# Patient Record
Sex: MALE | Race: WHITE | NOT HISPANIC OR LATINO | Employment: FULL TIME | URBAN - METROPOLITAN AREA
[De-identification: names, ages, dates, MRNs, and addresses within clinical notes are randomized per-mention and may not be internally consistent; named-entity substitution may affect disease eponyms.]

---

## 2020-09-25 ENCOUNTER — OFFICE VISIT (OUTPATIENT)
Dept: FAMILY MEDICINE CLINIC | Facility: CLINIC | Age: 40
End: 2020-09-25

## 2020-09-25 VITALS
DIASTOLIC BLOOD PRESSURE: 70 MMHG | WEIGHT: 199 LBS | TEMPERATURE: 97.8 F | BODY MASS INDEX: 25.54 KG/M2 | RESPIRATION RATE: 16 BRPM | HEIGHT: 74 IN | SYSTOLIC BLOOD PRESSURE: 110 MMHG | OXYGEN SATURATION: 98 % | HEART RATE: 76 BPM

## 2020-09-25 DIAGNOSIS — Z11.59 ENCOUNTER FOR HEPATITIS C SCREENING TEST FOR LOW RISK PATIENT: ICD-10-CM

## 2020-09-25 DIAGNOSIS — Z00.00 ANNUAL PHYSICAL EXAM: Primary | ICD-10-CM

## 2020-09-25 DIAGNOSIS — Z76.89 ENCOUNTER TO ESTABLISH CARE: ICD-10-CM

## 2020-09-25 DIAGNOSIS — Z80.0 FAMILY HISTORY OF COLON CANCER REQUIRING SCREENING COLONOSCOPY: ICD-10-CM

## 2020-09-25 PROCEDURE — 99396 PREV VISIT EST AGE 40-64: CPT | Performed by: NURSE PRACTITIONER

## 2020-09-25 PROCEDURE — 93000 ELECTROCARDIOGRAM COMPLETE: CPT | Performed by: NURSE PRACTITIONER

## 2020-09-25 NOTE — PROGRESS NOTES
"Chief Complaint   Patient presents with   • Establish Care   • Annual Exam       Subjective   Kingsley Bejarano is a 40 y.o. male and is here for a yearly physical exam. The patient reports no problems.    Do you take any herbs or supplements that were not prescribed by a doctor? no. If so, these will be added to active medication list.    Health Habits:  Dental Exam: Up to date  Eye Exam: Up to date  Diet: Nothing specific  Exercise: Run 25 miles a week    Current exercise activities include: Run  Colonoscopy: Never.  Mother had colon cancer.      The following portions of the patient's history were reviewed and updated as appropriate: allergies, current medications, past family history, past medical history, past social history, past surgical history and problem list.    Social and Family and Surgical History reviewed and updated today, see Rooming tab.    Health History, Preventive Measures and Vaccination flow sheets reviewed and updated today.    Patient's current medical chart in Epic; including previous office notes, imaging, labs, specialist's evaluation either in notes or in Media tab reviewed today.    Other pertinent medical information also reviewed thru Care Everywhere function is also reviewed today.    Review of Systems  Review of Systems   Constitutional: Negative.    HENT: Negative.    Respiratory: Negative.    Cardiovascular: Negative.    Gastrointestinal: Negative.    Endocrine: Negative.    Genitourinary: Negative.    Musculoskeletal: Negative.    Skin: Negative.    Neurological: Negative.    Hematological: Negative.    Psychiatric/Behavioral: Negative.        Vitals:    09/25/20 0825   BP: 110/70   BP Location: Left arm   Patient Position: Sitting   Cuff Size: Adult   Pulse: 76   Resp: 16   Temp: 97.8 °F (36.6 °C)   SpO2: 98%   Weight: 90.3 kg (199 lb)   Height: 188 cm (74\")       General Appearance:  Alert, cooperative, no distress, appears stated age   Head:  Normocephalic, without obvious " abnormality, atraumatic   Eyes:  PERRL, conjunctiva/corneas clear, EOM's intact.   Ears:  Normal TM's and external ear canals, both ears   Nose: Nares normal, septum midline, mucosa normal, no drainage or sinus tenderness   Throat: Lips, mucosa, and tongue normal; teeth and gums normal   Neck: Supple, symmetrical, trachea midline, no adenopathy;   thyroid: No enlargement/tenderness/nodules   Back:  Symmetric, no curvature, ROM normal, no CVA tenderness   Lungs:  Clear to auscultation bilaterally, respirations even and unlabored   Chest wall:  No tenderness or deformity   Heart:  Marshall rate and rhythm, S1 and S2 normal, no murmur, rub or gallop   Abdomen:  Soft, non-tender, bowel sounds active all four quadrants,   no masses, no organomegaly           Extremities: Extremities normal, atraumatic, no cyanosis or edema   Pulses: 2+ and symmetric all extremities   Skin: Skin color, texture, turgor normal, no rashes or lesions   Lymph nodes: Cervical, supraclavicular, and axillary nodes normal   Neurologic: CNII-XII intact. Normal strength, sensation and reflexes   throughout       ECG 12 Lead    Date/Time: 9/25/2020 9:04 AM  Performed by: Kristin Rogers APRN  Authorized by: Kristin Rogers APRN   Previous ECG: no previous ECG available  Rhythm: sinus bradycardia  Rate: bradycardic  BPM: 51  Conduction: conduction normal  ST Segments: ST segments normal  T Waves: T waves normal  QRS axis: normal  Other findings: T wave abnormality and early repolarization    Clinical impression: abnormal EKG  Comments: Sinus bradycardia.  Early repolarization present in leads V2, V3, V4. T-wave abnormality.  Check labs today. Asymptomatic.  Runner of 25 miles per week.                No results found for this or any previous visit.  Assessment/Plan   Healthy male exam.  Kingsley was seen today for establish care and annual exam.    Diagnoses and all orders for this visit:    Annual physical exam  -     ECG 12 Lead  -     CBC & Differential  -      Comprehensive Metabolic Panel  -     Lipid Panel  -     TSH    Encounter for hepatitis C screening test for low risk patient  -     Hepatitis C Antibody    Family history of colon cancer requiring screening colonoscopy  -     Ambulatory Referral For Screening Colonoscopy    Encounter to establish care      1. Well adult exam  2. Patient Counseling:  --Nutrition: Stressed importance of moderation in sodium/caffeine intake,      saturated fat and cholesterol.  Discussed caloric balance, sufficient intake of fresh fruits, vegetables, fiber, calcium, iron.  --Discussed the new recommendation against daily use of baby aspirin for primary prevention in low risk patients.  --Exercise: Stressed the importance of regular exercise.   --Substance Abuse: Discussed cessation/primary prevention of tobacco, alcohol,   or other drug use; driving or other dangerous activities under the influence.    --Dental health: Discussed importance of regular tooth brushing, flossing, and       dental visits.  --Suggested having eyes and vision checked if needed or past due.  --Immunizations reviewed.  --Discussed benefits of screening colonoscopy.  3. Discussed the patient's BMI with him.  The BMI is in the acceptable range  4. Follow up next physical in 1 year    Patient Instructions   Health Maintenance, Male  Adopting a healthy lifestyle and getting preventive care are important in promoting health and wellness. Ask your health care provider about:  · The right schedule for you to have regular tests and exams.  · Things you can do on your own to prevent diseases and keep yourself healthy.  What should I know about diet, weight, and exercise?  Eat a healthy diet    · Eat a diet that includes plenty of vegetables, fruits, low-fat dairy products, and lean protein.  · Do not eat a lot of foods that are high in solid fats, added sugars, or sodium.  Maintain a healthy weight  Body mass index (BMI) is a measurement that can be used to identify  possible weight problems. It estimates body fat based on height and weight. Your health care provider can help determine your BMI and help you achieve or maintain a healthy weight.  Get regular exercise  Get regular exercise. This is one of the most important things you can do for your health. Most adults should:  · Exercise for at least 150 minutes each week. The exercise should increase your heart rate and make you sweat (moderate-intensity exercise).  · Do strengthening exercises at least twice a week. This is in addition to the moderate-intensity exercise.  · Spend less time sitting. Even light physical activity can be beneficial.  Watch cholesterol and blood lipids  Have your blood tested for lipids and cholesterol at 20 years of age, then have this test every 5 years.  You may need to have your cholesterol levels checked more often if:  · Your lipid or cholesterol levels are high.  · You are older than 40 years of age.  · You are at high risk for heart disease.  What should I know about cancer screening?  Many types of cancers can be detected early and may often be prevented. Depending on your health history and family history, you may need to have cancer screening at various ages. This may include screening for:  · Colorectal cancer.  · Prostate cancer.  · Skin cancer.  · Lung cancer.  What should I know about heart disease, diabetes, and high blood pressure?  Blood pressure and heart disease  · High blood pressure causes heart disease and increases the risk of stroke. This is more likely to develop in people who have high blood pressure readings, are of  descent, or are overweight.  · Talk with your health care provider about your target blood pressure readings.  · Have your blood pressure checked:  ? Every 3-5 years if you are 18-39 years of age.  ? Every year if you are 40 years old or older.  · If you are between the ages of 65 and 75 and are a current or former smoker, ask your health care  provider if you should have a one-time screening for abdominal aortic aneurysm (AAA).  Diabetes  Have regular diabetes screenings. This checks your fasting blood sugar level. Have the screening done:  · Once every three years after age 45 if you are at a normal weight and have a low risk for diabetes.  · More often and at a younger age if you are overweight or have a high risk for diabetes.  What should I know about preventing infection?  Hepatitis B  If you have a higher risk for hepatitis B, you should be screened for this virus. Talk with your health care provider to find out if you are at risk for hepatitis B infection.  Hepatitis C  Blood testing is recommended for:  · Everyone born from 1945 through 1965.  · Anyone with known risk factors for hepatitis C.  Sexually transmitted infections (STIs)  · You should be screened each year for STIs, including gonorrhea and chlamydia, if:  ? You are sexually active and are younger than 24 years of age.  ? You are older than 24 years of age and your health care provider tells you that you are at risk for this type of infection.  ? Your sexual activity has changed since you were last screened, and you are at increased risk for chlamydia or gonorrhea. Ask your health care provider if you are at risk.  · Ask your health care provider about whether you are at high risk for HIV. Your health care provider may recommend a prescription medicine to help prevent HIV infection. If you choose to take medicine to prevent HIV, you should first get tested for HIV. You should then be tested every 3 months for as long as you are taking the medicine.  Follow these instructions at home:  Lifestyle  · Do not use any products that contain nicotine or tobacco, such as cigarettes, e-cigarettes, and chewing tobacco. If you need help quitting, ask your health care provider.  · Do not use street drugs.  · Do not share needles.  · Ask your health care provider for help if you need support or  information about quitting drugs.  Alcohol use  · Do not drink alcohol if your health care provider tells you not to drink.  · If you drink alcohol:  ? Limit how much you have to 0-2 drinks a day.  ? Be aware of how much alcohol is in your drink. In the [place] one drink equals one 12 oz bottle of beer (355 mL), one 5 oz glass of wine (148 mL), or one 1½ oz glass of hard liquor (44 mL).  General instructions  · Schedule regular health, dental, and eye exams.  · Stay current with your vaccines.  · Tell your health care provider if:  ? You often feel depressed.  ? You have ever been abused or do not feel safe at home.  Summary  · Adopting a healthy lifestyle and getting preventive care are important in promoting health and wellness.  · Follow your health care provider's instructions about healthy diet, exercising, and getting tested or screened for diseases.  · Follow your health care provider's instructions on monitoring your cholesterol and blood pressure.  This information is not intended to replace advice given to you by your health care provider. Make sure you discuss any questions you have with your health care provider.  Document Released: 06/15/2009 Document Revised: 12/11/2019 Document Reviewed: 12/11/2019  Elsevier Patient Education © 2020 [employer]        There are no discontinued medications.       Kristin Rogers, APRN  Family Practice  [facility] [place]

## 2020-09-26 LAB
ALBUMIN SERPL-MCNC: 5 G/DL (ref 3.5–5.2)
ALBUMIN/GLOB SERPL: 2.5 G/DL
ALP SERPL-CCNC: 108 U/L (ref 39–117)
ALT SERPL-CCNC: 17 U/L (ref 1–41)
AST SERPL-CCNC: 19 U/L (ref 1–40)
BASOPHILS # BLD AUTO: 0.04 10*3/MM3 (ref 0–0.2)
BASOPHILS NFR BLD AUTO: 0.7 % (ref 0–1.5)
BILIRUB SERPL-MCNC: 0.6 MG/DL (ref 0–1.2)
BUN SERPL-MCNC: 20 MG/DL (ref 6–20)
BUN/CREAT SERPL: 19.2 (ref 7–25)
CALCIUM SERPL-MCNC: 9.8 MG/DL (ref 8.6–10.5)
CHLORIDE SERPL-SCNC: 103 MMOL/L (ref 98–107)
CHOLEST SERPL-MCNC: 179 MG/DL (ref 0–200)
CO2 SERPL-SCNC: 26.8 MMOL/L (ref 22–29)
CREAT SERPL-MCNC: 1.04 MG/DL (ref 0.76–1.27)
EOSINOPHIL # BLD AUTO: 0.06 10*3/MM3 (ref 0–0.4)
EOSINOPHIL NFR BLD AUTO: 1.1 % (ref 0.3–6.2)
ERYTHROCYTE [DISTWIDTH] IN BLOOD BY AUTOMATED COUNT: 12.2 % (ref 12.3–15.4)
GLOBULIN SER CALC-MCNC: 2 GM/DL
GLUCOSE SERPL-MCNC: 90 MG/DL (ref 65–99)
HCT VFR BLD AUTO: 46.9 % (ref 37.5–51)
HCV AB S/CO SERPL IA: 0.1 S/CO RATIO (ref 0–0.9)
HDLC SERPL-MCNC: 43 MG/DL (ref 40–60)
HGB BLD-MCNC: 16.3 G/DL (ref 13–17.7)
IMM GRANULOCYTES # BLD AUTO: 0.03 10*3/MM3 (ref 0–0.05)
IMM GRANULOCYTES NFR BLD AUTO: 0.5 % (ref 0–0.5)
LDLC SERPL CALC-MCNC: 112 MG/DL (ref 0–100)
LYMPHOCYTES # BLD AUTO: 1.5 10*3/MM3 (ref 0.7–3.1)
LYMPHOCYTES NFR BLD AUTO: 26.9 % (ref 19.6–45.3)
MCH RBC QN AUTO: 31.2 PG (ref 26.6–33)
MCHC RBC AUTO-ENTMCNC: 34.8 G/DL (ref 31.5–35.7)
MCV RBC AUTO: 89.7 FL (ref 79–97)
MONOCYTES # BLD AUTO: 0.63 10*3/MM3 (ref 0.1–0.9)
MONOCYTES NFR BLD AUTO: 11.3 % (ref 5–12)
NEUTROPHILS # BLD AUTO: 3.32 10*3/MM3 (ref 1.7–7)
NEUTROPHILS NFR BLD AUTO: 59.5 % (ref 42.7–76)
NRBC BLD AUTO-RTO: 0 /100 WBC (ref 0–0.2)
PLATELET # BLD AUTO: 229 10*3/MM3 (ref 140–450)
POTASSIUM SERPL-SCNC: 5.1 MMOL/L (ref 3.5–5.2)
PROT SERPL-MCNC: 7 G/DL (ref 6–8.5)
RBC # BLD AUTO: 5.23 10*6/MM3 (ref 4.14–5.8)
SODIUM SERPL-SCNC: 140 MMOL/L (ref 136–145)
TRIGL SERPL-MCNC: 119 MG/DL (ref 0–150)
TSH SERPL DL<=0.005 MIU/L-ACNC: 2.19 UIU/ML (ref 0.27–4.2)
VLDLC SERPL CALC-MCNC: 23.8 MG/DL
WBC # BLD AUTO: 5.58 10*3/MM3 (ref 3.4–10.8)

## 2020-09-28 NOTE — PROGRESS NOTES
Notify Kingsley Bejarano all his recent labs look good.  Liver, kidney, and thyroid function tests are normal. LDL cholesterol level slightly elevated at 112., like less than 100.  All other cholesterol levels in normal range.  He already watches diet and exercises regularly.

## 2020-10-06 ENCOUNTER — PREP FOR SURGERY (OUTPATIENT)
Dept: GASTROENTEROLOGY | Facility: CLINIC | Age: 40
End: 2020-10-06

## 2020-10-06 DIAGNOSIS — Z83.71 FAMILY HX COLONIC POLYPS: ICD-10-CM

## 2020-10-06 DIAGNOSIS — Z80.0 FAMILY HX OF COLON CANCER: Primary | ICD-10-CM

## 2020-10-06 DIAGNOSIS — Z12.11 ENCOUNTER FOR SCREENING FOR MALIGNANT NEOPLASM OF COLON: ICD-10-CM

## 2021-01-05 ENCOUNTER — LAB REQUISITION (OUTPATIENT)
Dept: LAB | Facility: HOSPITAL | Age: 41
End: 2021-01-05

## 2021-01-05 DIAGNOSIS — Z00.00 ENCOUNTER FOR GENERAL ADULT MEDICAL EXAMINATION WITHOUT ABNORMAL FINDINGS: ICD-10-CM

## 2021-01-06 PROCEDURE — U0004 COV-19 TEST NON-CDC HGH THRU: HCPCS | Performed by: INTERNAL MEDICINE

## 2021-01-07 LAB — SARS-COV-2 RNA RESP QL NAA+PROBE: NOT DETECTED

## 2021-01-08 ENCOUNTER — OUTSIDE FACILITY SERVICE (OUTPATIENT)
Dept: GASTROENTEROLOGY | Facility: CLINIC | Age: 41
End: 2021-01-08

## 2021-01-08 ENCOUNTER — LAB REQUISITION (OUTPATIENT)
Dept: LAB | Facility: HOSPITAL | Age: 41
End: 2021-01-08

## 2021-01-08 DIAGNOSIS — Z12.11 ENCOUNTER FOR SCREENING FOR MALIGNANT NEOPLASM OF COLON: ICD-10-CM

## 2021-01-08 PROCEDURE — 45380 COLONOSCOPY AND BIOPSY: CPT | Performed by: INTERNAL MEDICINE

## 2021-01-08 PROCEDURE — 88305 TISSUE EXAM BY PATHOLOGIST: CPT | Performed by: INTERNAL MEDICINE

## 2021-01-19 LAB
LAB AP CASE REPORT: NORMAL
LAB AP CLINICAL INFORMATION: NORMAL
PATH REPORT.ADDENDUM SPEC: NORMAL
PATH REPORT.FINAL DX SPEC: NORMAL
PATH REPORT.GROSS SPEC: NORMAL

## 2021-06-28 ENCOUNTER — OFFICE VISIT (OUTPATIENT)
Dept: FAMILY MEDICINE CLINIC | Facility: CLINIC | Age: 41
End: 2021-06-28

## 2021-06-28 VITALS
WEIGHT: 205 LBS | BODY MASS INDEX: 26.31 KG/M2 | HEIGHT: 74 IN | OXYGEN SATURATION: 99 % | RESPIRATION RATE: 16 BRPM | DIASTOLIC BLOOD PRESSURE: 68 MMHG | SYSTOLIC BLOOD PRESSURE: 120 MMHG | HEART RATE: 74 BPM | TEMPERATURE: 98 F

## 2021-06-28 DIAGNOSIS — F32.A DEPRESSION, UNSPECIFIED DEPRESSION TYPE: Primary | ICD-10-CM

## 2021-06-28 PROCEDURE — 99213 OFFICE O/P EST LOW 20 MIN: CPT | Performed by: NURSE PRACTITIONER

## 2021-06-28 NOTE — PROGRESS NOTES
Patient ID: Kingsley Bejarano is a 40 y.o. male     Patient Care Team:  Head, EDWIN Santos as PCP - General (Nurse Practitioner)    Subjective     Chief Complaint   Patient presents with   • Depression       Kingsley Bejarano presents to [facility] today for depression.     Depression  Visit Type: initial  Onset of symptoms: 1 to 6 months ago  Progression since onset: waxing and waning  Patient presents with the following symptoms: decreased concentration, depressed mood and excessive worry.  Patient is not experiencing: fatigue, insomnia, nausea, palpitations, panic, shortness of breath and thoughts of death.  Severity: interfering with daily activities   Aggravated by: work stress  Sleep per night: 6 hours  Sleep quality: fair  Nighttime awakenings: none  Risk factors: family history      Main problem is increased stress with work.  He works approximately 60 hours/week.  Mainly goes to work and then comes home.  Unable to participate in activities he used to enjoy.  The current situation is starting to affect his marriage which is worsening his depression.  He recently went on vacation to [facility] over a week ago.  He does feel his mood has improved since then.  He is wishing to undergo counseling rather than starting medication at this time.  He also usually is able to run approximately 25 miles a week however is not able to do so due to work schedule at this time.    He denies any complaints of fever, chills, cough, chest pain, shortness of air, abdominal pain, nausea, or any other concerns.     The following portions of the patient's history were reviewed and updated as appropriate: allergies, current medications, past family history, past medical history, past social history, past surgical history and problem list.       Review of Systems   Cardiovascular: Negative for palpitations.   Respiratory: Negative for shortness of breath.    Psychiatric/Behavioral: Positive for decreased  concentration. The patient does not have insomnia.        Vitals:    06/28/21 1545   BP: 120/68   Pulse: 74   Resp: 16   Temp: 98 °F (36.7 °C)   SpO2: 99%       Documented weights    06/28/21 1545   Weight: 93 kg (205 lb)     Body mass index is 26.32 kg/m².    Results for orders placed or performed in visit on 01/08/21   Tissue Pathology Exam    Specimen: Large Intestine, Rectum; Tissue   Result Value Ref Range    Addendum       Addendum inadvertently created.         Case Report       Surgical Pathology Report                         Case: [id number]                                  Authorizing Provider:  Flo Person MD      Collected:           01/08/2021 08:45 AM          Ordering Location:     [facility]  Received:            01/08/2021 11:16 AM                                 LABORATORY                                                                   Pathologist:           Luly Tellez MD                                                    Specimen:    Large Intestine, Rectum, Rectal polyp x 2                                                  Clinical Information       Screening, family hx colon cancer      Final Diagnosis       1. Rectum, Biopsies:   A. Tubular adenoma and hyperplastic polyp.   B. Negative for high-grade dysplasia.    Swm/kds      Gross Description       1.  The specimen is received in formalin labeled with the patient's name and further designated 'rectal polyps biopsy' are multiple small fragments of gray-tan tissue. The specimen is submitted for embedding as received.         Patient screened positive for depression based on a PHQ-9 score of 10 on 6/28/2021. Follow-up recommendations include: Referral to Mental Health specialist.          Objective     Physical Exam  Vitals reviewed.   Constitutional:       General: He is not in acute distress.  Cardiovascular:      Rate and Rhythm: Normal rate.   Pulmonary:      Effort: Pulmonary effort is normal.    Neurological:      Mental Status: He is alert.   Psychiatric:         Attention and Perception: Attention normal.         Mood and Affect: Mood normal.         Behavior: Behavior normal.         Thought Content: Thought content normal.            Assessment/Plan     Assessment/Plan     Diagnoses and all orders for this visit:    1. Depression, unspecified depression type (Primary)  -     Ambulatory Referral to Psychology          Summary:  Kingsley Bejarano presented to office today due to worsening depression.  Appears to be primarily result of increased stress at work and excessive work hours.  He would like to undergo counseling rather than start medication at this time.  I placed referral for psychology and also provided a list of mental health specialist the patient if he wishes to call and schedule himself.  He is to let us know if he changes his mind and wishes to start medication to see if helps with controlling his symptoms.  Also advised would be helpful if he is able to get back into his regular exercise routine which would help improve his mood.    In the meantime, instructed to contact us sooner for any problems or concerns.    Follow Up:  Return if symptoms worsen or fail to improve.    Patient was given instructions and counseling regarding condition or for health maintenance advice.  Please see specific information pulled into the AVS if appropriate.      Patient was wearing facemask when I entered the room and throughout our encounter. Protective equipment was worn throughout this patient encounter including a face mask, eye protection,  and gloves. Hand hygiene was performed before donning protective equipment and after removal when leaving the room.     EDWIN Snider  Family Medicine  [facility] [place]  06/28/21  18:31 EDT

## 2021-06-28 NOTE — PATIENT INSTRUCTIONS
"http://APA.org/depression-guideline\"> https://clinicalkey.com\"> http://point-of-care.[employer]AK [employer]skills/\"> http://point-of-care.[employer].com\">   Managing Depression, Adult  Depression is a mental health condition that affects your thoughts, feelings, and actions. Being diagnosed with depression can bring you relief if you did not know why you have felt or behaved a certain way. It could also leave you feeling overwhelmed with uncertainty about your future. Preparing yourself to manage your symptoms can help you feel more positive about your future.  How to manage lifestyle changes  Managing stress    Stress is your body's reaction to life changes and events, both good and bad. Stress can add to your feelings of depression. Learning to manage your stress can help lessen your feelings of depression.  Try some of the following approaches to reducing your stress (stress reduction techniques):  · Listen to music that you enjoy and that inspires you.  · Try using a meditation brigette or take a meditation class.  · Develop a practice that helps you connect with your spiritual self. Walk in nature, pray, or go to a place of Evangelical.  · Do some deep breathing. To do this, inhale slowly through your nose. Pause at the top of your inhale for a few seconds and then exhale slowly, letting your muscles relax.  · Practice yoga to help relax and work your muscles.  Choose a stress reduction technique that suits your lifestyle and personality. These techniques take time and practice to develop. Set aside 5-15 minutes a day to do them. Therapists can offer training in these techniques. Other things you can do to manage stress include:  · Keeping a stress diary.  · Knowing your limits and saying no when you think something is too much.  · Paying attention to how you react to certain situations. You may not be able to control everything, but you can change your reaction.  · Adding humor to your life by " watching funny films or TV shows.  · Making time for activities that you enjoy and that relax you.    Medicines  Medicines, such as antidepressants, are often a part of treatment for depression.  · Talk with your pharmacist or health care provider about all the medicines, supplements, and herbal products that you take, their possible side effects, and what medicines and other products are safe to take together.  · Make sure to report any side effects you may have to your health care provider.  Relationships  Your health care provider may suggest family therapy, couples therapy, or individual therapy as part of your treatment.  How to recognize changes  Everyone responds differently to treatment for depression. As you recover from depression, you may start to:  · Have more interest in doing activities.  · Feel less hopeless.  · Have more energy.  · Overeat less often, or have a better appetite.  · Have better mental focus.  It is important to recognize if your depression is not getting better or is getting worse. The symptoms you had in the beginning may return, such as:  · Tiredness (fatigue) or low energy.  · Eating too much or too little.  · Sleeping too much or too little.  · Feeling restless, agitated, or hopeless.  · Trouble focusing or making decisions.  · Unexplained physical complaints.  · Feeling irritable, angry, or aggressive.  If you or your family members notice these symptoms coming back, let your health care provider know right away.  Follow these instructions at home:  Activity    · Try to get some form of exercise each day, such as walking, biking, swimming, or lifting weights.  · Practice stress reduction techniques.  · Engage your mind by taking a class or doing some volunteer work.  Lifestyle  · Get the right amount and quality of sleep.  · Cut down on using caffeine, tobacco, alcohol, and other potentially harmful substances.  · Eat a healthy diet that includes plenty of vegetables, fruits,  whole grains, low-fat dairy products, and lean protein. Do not eat a lot of foods that are high in solid fats, added sugars, or salt (sodium).  General instructions  · Take over-the-counter and prescription medicines only as told by your health care provider.  · Keep all follow-up visits as told by your health care provider. This is important.  Where to find support  Talking to others    Friends and family members can be sources of support and guidance. Talk to trusted friends or family members about your condition. Explain your symptoms to them, and let them know that you are working with a health care provider to treat your depression. Tell friends and family members how they also can be helpful.  Finances  · Find appropriate mental health providers that fit with your financial situation.  · Talk with your health care provider about options to get reduced prices on your medicines.  Where to find more information  You can find support in your area from:  · Anxiety and Depression Association of [place] (ADAA): www.adaa.org  · Mental Health [place]: www.mentalhealthamerica.net  · National [place] on Mental Illness: www.buck.org  Contact a health care provider if:  · You stop taking your antidepressant medicines, and you have any of these symptoms:  ? Nausea.  ? Headache.  ? Light-headedness.  ? Chills and body aches.  ? Not being able to sleep (insomnia).  · You or your friends and family think your depression is getting worse.  Get help right away if:  · You have thoughts of hurting yourself or others.  If you ever feel like you may hurt yourself or others, or have thoughts about taking your own life, get help right away. Go to your nearest emergency department or:  · Call your local emergency services (214 in the [place]  · Call a suicide crisis helpline, such as the National Suicide Prevention Lifeline at 1-471.575.2475. This is open 24 hours a day in the [place]  · Text the Crisis Text Line at 256592 (in the  [place]  Summary  · If you are diagnosed with depression, preparing yourself to manage your symptoms is a good way to feel positive about your future.  · Work with your health care provider on a management plan that includes stress reduction techniques, medicines (if applicable), therapy, and healthy lifestyle habits.  · Keep talking with your health care provider about how your treatment is working.  · If you have thoughts about taking your own life, call a suicide crisis helpline or text a crisis text line.  This information is not intended to replace advice given to you by your health care provider. Make sure you discuss any questions you have with your health care provider.  Document Revised: 10/28/2020 Document Reviewed: 10/28/2020  [employer] Patient Education © 2021 [employer]      Persistent Depressive Disorder, Adult  Persistent depressive disorder (PDD) is a mental health condition that causes symptoms of low-level depression for 2 years or longer. This disorder may also be called long-term (chronic) depression or dysthymia. PDD may include episodes of major depressive disorder (MDD), which is more severe depression that lasts for about 2 weeks.  PDD can affect the way you think, feel, and behave. This condition may also affect your relationships. You may be more likely to get sick if you have PDD.  What are the causes?  The exact cause of this condition is not known. PDD is most likely caused by a combination of things, which may include:  · Your personality traits.  · Your learned or conditioned behaviors, thoughts or feelings that reinforce negativity.  · Substance abuse or misuse.  · How you are able to manage chronic medical or mental health illness you may have.  · Going through a traumatic experience or major life changes.  What increases the risk?  The following factors may also make someone more likely to develop PDD:  · A family history of depression.  · Being a woman.  · Abnormally low levels of  certain brain chemicals.  · Traumatic or painful events in childhood, especially abuse or the loss of a parent.  · Chronic stress caused by upsetting life experiences, troubled family relationships, or losses.  · Chronic physical illness or other mental health disorders.  · Cultural stress, such as stress from poor living conditions or discrimination.  What are the signs or symptoms?  Symptoms of this condition may occur for most of the day, and may include:  · Physical symptoms. These may include:  ? Tiredness or low energy.  ? Eating or sleeping too much or too little.  ? Being restless or agitated.  ? Unexplained physical complaints.  · Mental and emotional symptoms. These may be:  ? Feeling hopeless, worthless, or guilty.  ? Anxiety.  ? Trouble focusing or making decisions.  ? Low self-esteem.  ? Negative outlook.  ? Feeling irritable.  ? Being unable to have fun or feel pleasure.  · Behavioral symptoms. These may include:  ? Withdrawing from others.  ? Aggressive behavior or anger.  How is this diagnosed?  This condition may be diagnosed based on:  · Your symptoms.  · Your medical history, including your mental health history. This may involve tests to evaluate your level of depression. You may be asked questions about your lifestyle, including any drug and alcohol use, and how long you have had symptoms of PDD.  · A physical exam.  · Blood tests to rule out other conditions.  PDD may be diagnosed if you have had the following symptoms:  · A depressed mood or loss of interest in things for 2 years or longer.  · Other symptoms of depression.  How is this treated?  This condition is usually treated by mental health professionals, such as psychologists, psychiatrists, and clinical social workers. You may need more than one type of treatment. Treatment may include:  · Psychotherapy, also called talk therapy or counseling. Types of psychotherapy include:  ? Cognitive behavioral therapy (CBT). This teaches you to  recognize unhealthy feelings, thoughts, and behaviors, and to replace them with positive thoughts and actions.  ? Interpersonal therapy (IPT). This helps you to improve the way you relate to and communicate with others.  ? Family therapy. This treatment involves members of your family.  · Medicines to treat anxiety and depression, or to help balance chemicals in your brain that affect emotions and behaviors.  · Lifestyle changes, such as:  ? Limiting alcohol and drug use.  ? Getting regular exercise.  ? Developing a consistent sleep routine.  ? Making healthy eating choices.  ? Spending more time outdoors.  A combination of psychotherapy and medicines is thought to be the most effective form of treatment.  Follow these instructions at home:  Activity    · Exercise regularly and spend time outdoors as told.  · Find activities that you enjoy doing, and make time to do them.  · Find healthy ways to manage stress, such as:  ? Meditation or deep breathing.  ? Spending time in nature.  ? Journaling.  · Return to your normal activities as told by your health care provider.  Alcohol and drug use  · If you drink alcohol:  ? Limit how much you use to:  § 0-1 drink a day for nonpregnant women.  § 0-2 drinks a day for men.  ? Be aware of how much alcohol is in your drink. In the [place] one drink equals one 12 oz bottle of beer (355 mL), one 5 oz glass of wine (148 mL), or one 1½ oz glass of hard liquor (44 mL).  ? Discuss your alcohol use with your health care provider. Alcohol can affect any antidepressant medicines you are taking.  · Discuss any drug use with your health care provider.  General instructions    · Take over-the-counter and prescription medicines and herbal preparations only as told by your health care provider.  · Eat a healthy diet and get plenty of sleep.  · Consider joining a support group. Your health care provider may be able to recommend one.  · Keep all follow-up visits as told by your health care  provider. This is important.  Where to find more information  · National [place] on Mental Illness: www.buck.org  · [place] National [place] of Mental Health: www.nimh.nih.gov  · American Psychiatric Association: www.psychiatry.org/patients-families  Contact a health care provider if:  · Your symptoms get worse.  · You develop new symptoms.  · You have trouble sleeping or doing your daily activities.  Get help right away if:  · You harm yourself.  · You have serious thoughts about hurting yourself or others.  · You see, hear, taste, smell, or feel things that are not real (hallucinate).  If you ever feel like you may hurt yourself or others, or have thoughts about taking your own life, get help right away. Go to your nearest emergency department or:  · Call your local emergency services (830 in the [place]  · Call a suicide crisis helpline, such as the National Suicide Prevention Lifeline at 1-846.888.7646. This is open 24 hours a day in the [place]  · Text the Crisis Text Line at 234501 (in the [place]  Summary  · Persistent depressive disorder (PDD) is a mental health condition that causes symptoms of low-level depression for 2 years or longer.  · This condition is usually treated by mental health professionals. You may need more than one type of treatment. Treatment may involve psychotherapy, medicines, and lifestyle changes.  · Get help right away if you have serious thoughts about hurting yourself or others.  This information is not intended to replace advice given to you by your health care provider. Make sure you discuss any questions you have with your health care provider.  Document Revised: 11/28/2020 Document Reviewed: 11/28/2020  Elsevier Patient Education © 2021 [employer].

## 2022-08-18 DIAGNOSIS — Z00.00 ANNUAL PHYSICAL EXAM: ICD-10-CM

## 2022-08-18 DIAGNOSIS — Z00.00 ANNUAL PHYSICAL EXAM: Primary | ICD-10-CM

## 2022-08-20 LAB
ALBUMIN SERPL-MCNC: 5.1 G/DL (ref 4–5)
ALBUMIN/GLOB SERPL: 2.7 {RATIO} (ref 1.2–2.2)
ALP SERPL-CCNC: 91 IU/L (ref 44–121)
ALT SERPL-CCNC: 20 IU/L (ref 0–44)
AST SERPL-CCNC: 28 IU/L (ref 0–40)
BILIRUB SERPL-MCNC: 0.8 MG/DL (ref 0–1.2)
BUN SERPL-MCNC: 25 MG/DL (ref 6–24)
BUN/CREAT SERPL: 26 (ref 9–20)
CALCIUM SERPL-MCNC: 10.1 MG/DL (ref 8.7–10.2)
CHLORIDE SERPL-SCNC: 102 MMOL/L (ref 96–106)
CHOLEST SERPL-MCNC: 184 MG/DL (ref 100–199)
CO2 SERPL-SCNC: 22 MMOL/L (ref 20–29)
CREAT SERPL-MCNC: 0.98 MG/DL (ref 0.76–1.27)
EGFRCR-CYS SERPLBLD CKD-EPI 2021: 98 ML/MIN/1.73
ERYTHROCYTE [DISTWIDTH] IN BLOOD BY AUTOMATED COUNT: 12.9 % (ref 11.6–15.4)
GLOBULIN SER CALC-MCNC: 1.9 G/DL (ref 1.5–4.5)
GLUCOSE SERPL-MCNC: 78 MG/DL (ref 65–99)
HCT VFR BLD AUTO: 46.9 % (ref 37.5–51)
HDLC SERPL-MCNC: 52 MG/DL
HGB BLD-MCNC: 15.5 G/DL (ref 13–17.7)
LDLC SERPL CALC-MCNC: 119 MG/DL (ref 0–99)
MCH RBC QN AUTO: 29.8 PG (ref 26.6–33)
MCHC RBC AUTO-ENTMCNC: 33 G/DL (ref 31.5–35.7)
MCV RBC AUTO: 90 FL (ref 79–97)
PLATELET # BLD AUTO: 234 X10E3/UL (ref 150–450)
POTASSIUM SERPL-SCNC: 5.4 MMOL/L (ref 3.5–5.2)
PROT SERPL-MCNC: 7 G/DL (ref 6–8.5)
RBC # BLD AUTO: 5.2 X10E6/UL (ref 4.14–5.8)
SODIUM SERPL-SCNC: 141 MMOL/L (ref 134–144)
TRIGL SERPL-MCNC: 69 MG/DL (ref 0–149)
TSH SERPL DL<=0.005 MIU/L-ACNC: 1.51 UIU/ML (ref 0.45–4.5)
VLDLC SERPL CALC-MCNC: 13 MG/DL (ref 5–40)
WBC # BLD AUTO: 5.2 X10E3/UL (ref 3.4–10.8)

## 2022-08-26 ENCOUNTER — OFFICE VISIT (OUTPATIENT)
Dept: FAMILY MEDICINE CLINIC | Facility: CLINIC | Age: 42
End: 2022-08-26

## 2022-08-26 VITALS
OXYGEN SATURATION: 99 % | WEIGHT: 192 LBS | HEIGHT: 74 IN | SYSTOLIC BLOOD PRESSURE: 110 MMHG | BODY MASS INDEX: 24.64 KG/M2 | DIASTOLIC BLOOD PRESSURE: 70 MMHG | RESPIRATION RATE: 16 BRPM | HEART RATE: 62 BPM | TEMPERATURE: 98 F

## 2022-08-26 DIAGNOSIS — E78.00 ELEVATED LDL CHOLESTEROL LEVEL: ICD-10-CM

## 2022-08-26 DIAGNOSIS — G89.29 CHRONIC PAIN OF RIGHT WRIST: ICD-10-CM

## 2022-08-26 DIAGNOSIS — S76.212A STRAIN OF LEFT GROIN: ICD-10-CM

## 2022-08-26 DIAGNOSIS — Z00.00 ANNUAL PHYSICAL EXAM: Primary | ICD-10-CM

## 2022-08-26 DIAGNOSIS — Z23 IMMUNIZATION DUE: ICD-10-CM

## 2022-08-26 DIAGNOSIS — M25.531 CHRONIC PAIN OF RIGHT WRIST: ICD-10-CM

## 2022-08-26 PROCEDURE — 90715 TDAP VACCINE 7 YRS/> IM: CPT | Performed by: NURSE PRACTITIONER

## 2022-08-26 PROCEDURE — 90471 IMMUNIZATION ADMIN: CPT | Performed by: NURSE PRACTITIONER

## 2022-08-26 PROCEDURE — 99396 PREV VISIT EST AGE 40-64: CPT | Performed by: NURSE PRACTITIONER

## 2022-08-26 NOTE — PROGRESS NOTES
Chief Complaint   Patient presents with   • Annual Exam       Patient Care Team:  Head, EDWIN Santos as PCP - General (Nurse Practitioner)    Subjective   Kingsley Bejarano is a 41 y.o. male and is here for a yearly physical exam. The patient reports problems - right wrist pain and left groin pain.      Right wrist pain - onset 5+ years ago.  Intermittent.  Recently changed keyboard at work which has helped.  Sometimes has trouble using right hand and will have numbness in 5th digit.  Recalls curling too heavy of weights many years ago.  No treatment.      6 months left groin pain.  No home remedies.  No known injury.  Intermittent.  Occurs when he is coughing or sit-up.  Was doing weight lifting however stopped about one month due to rather run for exercise.  Has not noticed improvement in symptoms.        Do you take any herbs or supplements that were not prescribed by a doctor? no. If so, these will be added to active medication list.    Health Habits:  Dental Exam: Up to date  Eye Exam: Up to date  Diet: Nothing specific - Keto diet during work week.    Exercise: Run 20 miles a week    Current exercise activities include: Run  Colonoscopy: 1/8/2021 - repeat in 4 years.  Mother had hx of colon cancer.      The following portions of the patient's history were reviewed and updated as appropriate: allergies, current medications, past family history, past medical history, past social history, past surgical history and problem list.    Social and Family and Surgical History reviewed and updated today, see Rooming tab.    Health History, Preventive Measures and Vaccination flow sheets reviewed and updated today.    Patient's current medical chart in Epic; including previous office notes, imaging, labs, specialist's evaluation either in notes or in Media tab reviewed today.    Other pertinent medical information also reviewed thru Care Everywhere function is also reviewed today.    Review of Systems  Review of  "Systems  Vitals:    08/26/22 0825   BP: 110/70   BP Location: Left arm   Patient Position: Sitting   Cuff Size: Adult   Pulse: 62   Resp: 16   Temp: 98 °F (36.7 °C)   SpO2: 99%   Weight: 87.1 kg (192 lb)   Height: 188 cm (74\")     Wt Readings from Last 3 Encounters:   08/26/22 87.1 kg (192 lb)   06/28/21 93 kg (205 lb)   09/25/20 90.3 kg (199 lb)       General Appearance:  Alert, cooperative, no distress, appears stated age   Head:  Normocephalic, without obvious abnormality, atraumatic   Eyes:  PERRL, conjunctiva/corneas clear, EOM's intact.   Ears:  Normal TM's and external ear canals, both ears   Nose: Nares normal, septum midline, mucosa normal, no drainage or sinus tenderness   Throat: Lips, mucosa, and tongue normal; teeth and gums normal   Neck: Supple, symmetrical, trachea midline, no adenopathy;   thyroid: No enlargement/tenderness/nodules       Lungs:  Clear to auscultation bilaterally, respirations even and unlabored   Chest wall:  No tenderness or deformity   Heart:  Regular rate and rhythm, S1 and S2 normal, no murmur, rub or gallop   Abdomen:  Soft, non-tender, bowel sounds active all four quadrants,   no masses, no organomegaly    :  Chaperoned per Mikayla Hough CMA. No inguinal hernia noted bilaterally.  Tenderness to left groin muscle.         Extremities: Full ROM of right wrist.  Tenderness to ulnar aspect of redness.  No bruising, redness, or swelling.     Pulses: 2+ and symmetric all extremities   Skin: Skin color, texture, turgor normal, no rashes or lesions   Lymph nodes: Cervical, supraclavicular, and axillary nodes normal   Neurologic: CNII-XII intact. Normal strength, sensation and reflexes   throughout       Results for orders placed or performed in visit on 08/18/22   TSH    Specimen: Blood   Result Value Ref Range    TSH 1.510 0.450 - 4.500 uIU/mL   Lipid Panel    Specimen: Blood   Result Value Ref Range    Total Cholesterol 184 100 - 199 mg/dL    Triglycerides 69 0 - 149 mg/dL    HDL " Cholesterol 52 >39 mg/dL    VLDL Cholesterol Wilner 13 5 - 40 mg/dL    LDL Chol Calc ([facility]) 119 (H) 0 - 99 mg/dL   Comprehensive Metabolic Panel    Specimen: Blood   Result Value Ref Range    Glucose 78 65 - 99 mg/dL    BUN 25 (H) 6 - 24 mg/dL    Creatinine 0.98 0.76 - 1.27 mg/dL    EGFR Result 98 >59 mL/min/1.73    BUN/Creatinine Ratio 26 (H) 9 - 20    Sodium 141 134 - 144 mmol/L    Potassium 5.4 (H) 3.5 - 5.2 mmol/L    Chloride 102 96 - 106 mmol/L    Total CO2 22 20 - 29 mmol/L    Calcium 10.1 8.7 - 10.2 mg/dL    Total Protein 7.0 6.0 - 8.5 g/dL    Albumin 5.1 (H) 4.0 - 5.0 g/dL    Globulin 1.9 1.5 - 4.5 g/dL    A/G Ratio 2.7 (H) 1.2 - 2.2    Total Bilirubin 0.8 0.0 - 1.2 mg/dL    Alkaline Phosphatase 91 44 - 121 IU/L    AST (SGOT) 28 0 - 40 IU/L    ALT (SGPT) 20 0 - 44 IU/L   CBC (No Diff)    Specimen: Blood   Result Value Ref Range    WBC 5.2 3.4 - 10.8 x10E3/uL    RBC 5.20 4.14 - 5.80 x10E6/uL    Hemoglobin 15.5 13.0 - 17.7 g/dL    Hematocrit 46.9 37.5 - 51.0 %    MCV 90 79 - 97 fL    MCH 29.8 26.6 - 33.0 pg    MCHC 33.0 31.5 - 35.7 g/dL    RDW 12.9 11.6 - 15.4 %    Platelets 234 150 - 450 x10E3/uL     Assessment & Plan   Healthy male exam.  Diagnoses and all orders for this visit:    1. Annual physical exam (Primary)  -     CBC (No Diff); Future  -     Comprehensive Metabolic Panel; Future  -     Lipid Panel With / Chol / HDL Ratio; Future  -     TSH Rfx On Abnormal To Free T4; Future    2. Chronic pain of right wrist  -     Ambulatory Referral to Hand Surgery    3. Immunization due  -     Tdap Vaccine Greater Than or Equal To 8yo IM    4. Strain of left groin   Rest.  Avoid heavy lifting.  Try heating pad to area.  Stretching exercises.  Tylenol or ibuprofen.  If symptoms do not improve, will need to proceed with CT abdomen and pelvis for further evaluation.     5. Elevated LDL cholesterol level  -     Lipid Panel With / Chol / HDL Ratio; Future      1. Kingsley Bejarano has been doing well except for chronic right  wrist pain and new onset left groin pain.  Referral to hand and wrist surgeon due to ongoing problems concerning his right wrist.  Exam appears stable in office today.  No signs of inguinal hernia on exam today.  Appears to be related to left groin strain.  Instructions provided.  He is to let us know if symptoms do not improve with these measures.  Reviewed recent labs along with patient which all appear stable.  LDL cholesterol slightly elevated however all other values normal.  He watches his diet and exercises regularly.  He takes no prescription medication on a regular basis.  We will have him return in 1 year for next annual physical with fasting labs.  In the meantime, instructed contact us sooner for any problems or concerns.  2. Patient Counseling:  --Nutrition: Stressed importance of moderation in sodium/caffeine intake, saturated fat and cholesterol.  Discussed caloric balance, sufficient intake of fresh fruits, vegetables, fiber,    calcium, iron.  --Exercise: Stressed the importance of regular exercise.   --Substance Abuse: Discussed cessation/primary prevention of tobacco, alcohol, or other drug use; driving or other dangerous activities under the influence.    --Dental health: Discussed importance of regular tooth brushing, flossing, and dental visits.  --Suggested having eyes and vision checked if needed or past due.  --Immunizations reviewed.  --Discussed benefits of screening colonoscopy.  3. Discussed the patient's BMI with him.  The BMI is in the acceptable range  4. Follow up next physical in 1 year    Patient was given instructions and counseling regarding condition or for health maintenance advice.  Please see specific information pulled into the AVS if appropriate.      There are no discontinued medications.       Patient was wearing facemask when I entered the room and throughout our encounter. Protective equipment was worn by me throughout this patient encounter including a face mask.  Hand  hygiene was performed before donning protective equipment and after removal when leaving the room.     Kristin Rogers, EDWIN  Family Practice  [facility]

## 2023-09-01 ENCOUNTER — OFFICE VISIT (OUTPATIENT)
Dept: FAMILY MEDICINE CLINIC | Facility: CLINIC | Age: 43
End: 2023-09-01
Payer: COMMERCIAL

## 2023-09-01 VITALS
HEART RATE: 59 BPM | HEIGHT: 74 IN | OXYGEN SATURATION: 99 % | DIASTOLIC BLOOD PRESSURE: 70 MMHG | WEIGHT: 178 LBS | SYSTOLIC BLOOD PRESSURE: 114 MMHG | BODY MASS INDEX: 22.84 KG/M2 | TEMPERATURE: 97.8 F

## 2023-09-01 DIAGNOSIS — Z00.00 ANNUAL PHYSICAL EXAM: Primary | ICD-10-CM

## 2023-09-01 DIAGNOSIS — E78.2 MIXED HYPERLIPIDEMIA: ICD-10-CM

## 2023-09-01 PROCEDURE — 99396 PREV VISIT EST AGE 40-64: CPT | Performed by: NURSE PRACTITIONER

## 2023-09-01 NOTE — PROGRESS NOTES
"Chief Complaint   Patient presents with    Annual Exam       Patient Care Team:  Head, EDWIN Santos as PCP - General (Nurse Practitioner)    Subjective   Kingsley Bejarano is a 42 y.o. male and is here for a yearly physical exam. The patient reports no problems.    Do you take any herbs or supplements that were not prescribed by a doctor? no. If so, these will be added to active medication list.    Health Habits:  Dental Exam: Up to date  Eye Exam: Up to date  Diet: Keto.  Increased eggs and cheese  Exercise: Run 20 miles a week    Current exercise activities include: Run  Colonoscopy: 1/8/2021 - Dr. Stringer.  Repeat in 4 years.  Mother had hx of colon cancer.      The following portions of the patient's history were reviewed and updated as appropriate: allergies, current medications, past family history, past medical history, past social history, past surgical history, and problem list.    Social and Family and Surgical History reviewed and updated today, see Rooming tab.    Health History, Preventive Measures and Vaccination flow sheets reviewed and updated today.    Patient's current medical chart in Epic; including previous office notes, imaging, labs, specialist's evaluation either in notes or in Media tab reviewed today.    Other pertinent medical information also reviewed thru Care Everywhere function is also reviewed today.    Review of Systems  Review of Systems  Vitals:    09/01/23 0836   BP: 114/70   Pulse: 59   Temp: 97.8 øF (36.6 øC)   SpO2: 99%   Weight: 80.7 kg (178 lb)   Height: 188 cm (74.02\")     Wt Readings from Last 3 Encounters:   09/01/23 80.7 kg (178 lb)   08/26/22 87.1 kg (192 lb)   06/28/21 93 kg (205 lb)       General Appearance:  Alert, cooperative, no distress, appears stated age.  Down 14 pounds.     Head:  Normocephalic, without obvious abnormality, atraumatic   Eyes:  PERRL, conjunctiva/corneas clear, EOM's intact.   Ears:  Normal TM's and external ear canals, both ears   Nose: Nares " normal, septum midline, mucosa normal, no drainage or sinus tenderness   Throat: Lips, mucosa, and tongue normal; teeth and gums normal   Neck: Supple, symmetrical, trachea midline, no adenopathy;   thyroid: No enlargement/tenderness/nodules       Lungs:  Clear to auscultation bilaterally, respirations even and unlabored   Chest wall:  No tenderness or deformity   Heart:  Regular rate and rhythm, S1 and S2 normal, no murmur, rub or gallop   Abdomen:  Soft, non-tender, bowel sounds active all four quadrants,   no masses, no organomegaly           Extremities: Extremities normal, atraumatic, no cyanosis or edema   Pulses: 2+ and symmetric all extremities   Skin: Skin color, texture, turgor normal, no rashes or lesions   Lymph nodes: Cervical and supraclavicular nodes normal   Neurologic: CNII-XII intact. Normal strength.         Results for orders placed or performed in visit on 08/26/23   CBC (No Diff)    Specimen: Blood   Result Value Ref Range    WBC 3.7 3.4 - 10.8 x10E3/uL    RBC 4.77 4.14 - 5.80 x10E6/uL    Hemoglobin 14.6 13.0 - 17.7 g/dL    Hematocrit 44.7 37.5 - 51.0 %    MCV 94 79 - 97 fL    MCH 30.6 26.6 - 33.0 pg    MCHC 32.7 31.5 - 35.7 g/dL    RDW 13.1 11.6 - 15.4 %    Platelets 195 150 - 450 x10E3/uL   Comprehensive Metabolic Panel    Specimen: Blood   Result Value Ref Range    Glucose 84 70 - 99 mg/dL    BUN 26 (H) 6 - 24 mg/dL    Creatinine 0.97 0.76 - 1.27 mg/dL    EGFR Result 99 >59 mL/min/1.73    BUN/Creatinine Ratio 27 (H) 9 - 20    Sodium 142 134 - 144 mmol/L    Potassium 4.8 3.5 - 5.2 mmol/L    Chloride 105 96 - 106 mmol/L    Total CO2 22 20 - 29 mmol/L    Calcium 9.7 8.7 - 10.2 mg/dL    Total Protein 6.6 6.0 - 8.5 g/dL    Albumin 4.5 4.1 - 5.1 g/dL    Globulin 2.1 1.5 - 4.5 g/dL    A/G Ratio 2.1 1.2 - 2.2    Total Bilirubin 0.5 0.0 - 1.2 mg/dL    Alkaline Phosphatase 84 44 - 121 IU/L    AST (SGOT) 19 0 - 40 IU/L    ALT (SGPT) 14 0 - 44 IU/L   Lipid Panel With / Chol / HDL Ratio    Specimen:  Blood   Result Value Ref Range    Total Cholesterol 265 (H) 100 - 199 mg/dL    Triglycerides 99 0 - 149 mg/dL    HDL Cholesterol 43 >39 mg/dL    VLDL Cholesterol Wilner 17 5 - 40 mg/dL    LDL Chol Calc (NIH) 205 (H) 0 - 99 mg/dL    Comment Comment     Chol/HDL Ratio 6.2 (H) 0.0 - 5.0 ratio   TSH Rfx On Abnormal To Free T4    Specimen: Blood   Result Value Ref Range    TSH 1.800 0.450 - 4.500 uIU/mL     Assessment & Plan   Healthy male exam.  Diagnoses and all orders for this visit:    1. Annual physical exam (Primary)  -     CBC (No Diff); Future  -     Comprehensive Metabolic Panel; Future  -     Lipid Panel With / Chol / HDL Ratio; Future  -     TSH Rfx On Abnormal To Free T4; Future  -     UA / M With / Rflx Culture([facility] ONLY) - Urine, Clean Catch; Future    2. Mixed hyperlipidemia  -     Comprehensive Metabolic Panel; Future  -     Lipid Panel With / Chol / HDL Ratio; Future      1. Kingsley Bejarano has been doing well since last seen.  Reviewed recent labs along with patient.  Cholesterol levels have worsen since last year.  He has been strict Keto since last year with increased eggs and cheese.  He exercises regularly.  We will continue to manage with watching diet.  He knows to limit his cholesterol intake.  All other labs are stable.  Blood pressure stable at 114/70.  We will have him return in 1 year for next annual physical with fasting labs.  In the meantime, instructed contact us sooner for any problems or concerns.  2. Patient Counseling:  --Nutrition: Stressed importance of moderation in sodium/caffeine intake, saturated fat and cholesterol.  Discussed caloric balance, sufficient intake of fresh fruits, vegetables, fiber,    calcium, iron.  --Exercise: Stressed the importance of regular exercise.   --Substance Abuse: Discussed cessation/primary prevention of tobacco, alcohol, or other drug use; driving or other dangerous activities under the influence.    --Dental health: Discussed importance of regular  tooth brushing, flossing, and dental visits.  --Suggested having eyes and vision checked if needed or past due.  --Immunizations reviewed.  --Discussed benefits of screening colonoscopy. Due 2025.    3. Discussed the patient's BMI with him.  The BMI is in the acceptable range  4. Follow up next physical in 1 year    Patient was given instructions and counseling regarding condition or for health maintenance advice.  Please see specific information pulled into the AVS if appropriate.      There are no discontinued medications.       Kristin Head, APRN  Family Practice  [facility] [place]

## 2023-09-05 ENCOUNTER — TELEPHONE (OUTPATIENT)
Dept: FAMILY MEDICINE CLINIC | Facility: CLINIC | Age: 43
End: 2023-09-05

## 2023-09-05 ENCOUNTER — HOSPITAL ENCOUNTER (OUTPATIENT)
Dept: GENERAL RADIOLOGY | Facility: HOSPITAL | Age: 43
Discharge: HOME | End: 2023-09-05
Admitting: NURSE PRACTITIONER
Payer: COMMERCIAL

## 2023-09-05 ENCOUNTER — OFFICE VISIT (OUTPATIENT)
Dept: FAMILY MEDICINE CLINIC | Facility: CLINIC | Age: 43
End: 2023-09-05
Payer: COMMERCIAL

## 2023-09-05 VITALS
BODY MASS INDEX: 22.59 KG/M2 | TEMPERATURE: 97.8 F | SYSTOLIC BLOOD PRESSURE: 118 MMHG | HEART RATE: 100 BPM | OXYGEN SATURATION: 96 % | WEIGHT: 176 LBS | DIASTOLIC BLOOD PRESSURE: 80 MMHG

## 2023-09-05 DIAGNOSIS — R30.0 DYSURIA: ICD-10-CM

## 2023-09-05 DIAGNOSIS — R10.9 LEFT FLANK PAIN: ICD-10-CM

## 2023-09-05 DIAGNOSIS — R31.9 HEMATURIA, UNSPECIFIED TYPE: Primary | ICD-10-CM

## 2023-09-05 LAB
BILIRUB BLD-MCNC: NEGATIVE MG/DL
CLARITY, POC: ABNORMAL
COLOR UR: YELLOW
GLUCOSE UR STRIP-MCNC: NEGATIVE MG/DL
KETONES UR QL: NEGATIVE
LEUKOCYTE EST, POC: ABNORMAL
NITRITE UR-MCNC: NEGATIVE MG/ML
PH UR: 6 [PH] (ref 5–8)
PROT UR STRIP-MCNC: NEGATIVE MG/DL
RBC # UR STRIP: ABNORMAL /UL
SP GR UR: 1.01 (ref 1–1.03)
UROBILINOGEN UR QL: NORMAL

## 2023-09-05 PROCEDURE — 74018 RADEX ABDOMEN 1 VIEW: CPT

## 2023-09-05 RX ORDER — CIPROFLOXACIN 500 MG/1
500 TABLET, FILM COATED ORAL 2 TIMES DAILY
Qty: 20 TABLET | Refills: 0 | Status: SHIPPED | OUTPATIENT
Start: 2023-09-05 | End: 2023-09-15

## 2023-09-05 RX ORDER — KETOROLAC TROMETHAMINE 30 MG/ML
60 INJECTION, SOLUTION INTRAMUSCULAR; INTRAVENOUS ONCE
Status: COMPLETED | OUTPATIENT
Start: 2023-09-05 | End: 2023-09-05

## 2023-09-05 RX ADMIN — KETOROLAC TROMETHAMINE 60 MG: 30 INJECTION, SOLUTION INTRAMUSCULAR; INTRAVENOUS at 10:21

## 2023-09-05 NOTE — TELEPHONE ENCOUNTER
Caller: Kingsley Bejarano    Relationship: Self    Best call back number:     What is the best time to reach you:     Who are you requesting to speak with (clinical staff, provider,  specific staff member):     Do you know the name of the person who called:     What was the call regarding: PATIENT SAYS HIS XRAYS DO NOT SHOW ANY KIDNEY STONES SO HE IS CALLING IN TO REQUEST A CALL BACK TO DISCUSS THE NEXT PLAN OF ACTION    Is it okay if the provider responds through MyChart:

## 2023-09-05 NOTE — TELEPHONE ENCOUNTER
Caller: NGOZI STANFORD    Relationship to patient: Emergency Contact    Best call back number: [phone]     Chief complaint: SHAKING, DIARREAH, DIFFICULTY URINATING, BURNING URINATION, HEADACHE, PAIN IN LEFT TESTICLE     Type of visit: SAME DAY    Requested date: 09-     Additional notes:HUB UNABLE TO WARM TRANSFER, PLEASE CALL TO SCHEDULE

## 2023-09-05 NOTE — PROGRESS NOTES
Patient ID: Kingsley Bejarano is a 42 y.o. male     Patient Care Team:  Head, EDWIN Santos as PCP - General (Nurse Practitioner)    Subjective     Chief Complaint   Patient presents with    Testicle Pain     Possible uti       History of Present Illness    Kingsley Bejarano presents to [facility] today for complaints of dysuria, increased urinary frequency, and left testicular pain.  .   Dysuria, and urinary pressure.   Increased urination last night around 12 times.    Small amount of urine when he does urinate.    Has increased water intake.      He denies any complaints of fever, chills, cough, chest pain, shortness of air, abdominal pain, nausea, or any other concerns.     The following portions of the patient's history were reviewed and updated as appropriate: allergies, current medications, past family history, past medical history, past social history, past surgical history and problem list.       ROS    Vitals:    09/05/23 0951   BP: 118/80   Pulse: 100   Temp: 97.8 °F (36.6 °C)   SpO2: 96%       Documented weights    09/05/23 0951   Weight: 79.8 kg (176 lb)     Body mass index is 22.59 kg/m².    Results for orders placed or performed in visit on 09/05/23   POCT urinalysis dipstick, manual    Specimen: Urine   Result Value Ref Range    Color Yellow Yellow, Straw, Dark Yellow, Radha    Clarity, UA Hazy (A) Clear    Glucose, UA Negative Negative mg/dL    Bilirubin Negative Negative    Ketones, UA Negative Negative    Specific Gravity  1.010 1.005 - 1.030    Blood, UA Moderate (A) Negative    pH, Urine 6.0 5.0 - 8.0    Protein, POC Negative Negative mg/dL    Urobilinogen, UA Normal Normal, 0.2 E.U./dL    Leukocytes Large (3+) (A) Negative    Nitrite, UA Negative Negative           Objective     Physical Exam  Vitals reviewed. Exam conducted with a chaperone present (Maria Alejandra Jaeger MA).   Constitutional:       Comments: Uncomfortable due to pain.  Difficulty sitting down.      Cardiovascular:      Rate and Rhythm: Normal rate.   Pulmonary:      Effort: Pulmonary effort is normal.   Abdominal:      Palpations: Abdomen is soft.      Tenderness: There is left CVA tenderness. There is no right CVA tenderness.      Hernia: There is no hernia in the left inguinal area or right inguinal area.   Genitourinary:     Penis: Normal.       Testes: Normal.         Right: Mass, tenderness or swelling not present.         Left: Mass, tenderness or swelling not present.      Epididymis:      Right: Normal.      Left: Normal.   Lymphadenopathy:      Lower Body: No right inguinal adenopathy. No left inguinal adenopathy.   Neurological:      Mental Status: He is alert.     Brief Urine Lab Results  (Last result in the past 365 days)        Color   Clarity   Blood   Leuk Est   Nitrite   Protein   CREAT   Urine HCG        09/05/23 1451 Yellow   Hazy   Moderate   Large (3+)   Negative   Negative                      Assessment & Plan     Assessment/Plan     Diagnoses and all orders for this visit:    1. Hematuria, unspecified type (Primary)  -     Urine Culture - Urine, Urine, Catheter  -     ketorolac (TORADOL) injection 60 mg  -     XR Abdomen KUB  -     ciprofloxacin (Cipro) 500 MG tablet; Take 1 tablet by mouth 2 (Two) Times a Day for 10 days.  Dispense: 20 tablet; Refill: 0    2. Dysuria  -     POCT urinalysis dipstick, manual  -     ciprofloxacin (Cipro) 500 MG tablet; Take 1 tablet by mouth 2 (Two) Times a Day for 10 days.  Dispense: 20 tablet; Refill: 0    3. Left flank pain  -     ketorolac (TORADOL) injection 60 mg  -     XR Abdomen KUB      Follow Up:  Return if symptoms worsen or fail to improve.    Patient was given instructions and counseling regarding condition or for health maintenance advice.  Please see specific information pulled into the AVS if appropriate.          Kristin Rogers, APRN  Family Medicine  [facility] [place]  09/05/23  15:06 EDT

## 2023-09-08 LAB
BACTERIA UR CULT: ABNORMAL
BACTERIA UR CULT: ABNORMAL
OTHER ANTIBIOTIC SUSC ISLT: ABNORMAL

## 2024-10-16 DIAGNOSIS — E78.2 MIXED HYPERLIPIDEMIA: ICD-10-CM

## 2024-10-16 DIAGNOSIS — Z00.00 ANNUAL PHYSICAL EXAM: Primary | ICD-10-CM

## 2024-10-29 NOTE — PROGRESS NOTES
"Chief Complaint   Patient presents with    Annual Exam       Patient Care Team:  Head, EDWIN Santos as PCP - General (Nurse Practitioner)    Subjective   Kingsley Bejarano is a 44 y.o. male and is here for a yearly physical exam. The patient reports no problems.    Do you take any herbs or supplements that were not prescribed by a doctor? no. If so, these will be added to active medication list.    Health Habits:  Dental Exam: Up to date  Eye Exam: Up to date  Diet: Keto. Intermittent.  Increased eggs and cheese  Exercise: Limited due to needing to travel for work.  Works in [employer].    Current exercise activities   Colonoscopy: 1/8/2021 - Dr. Stringer.  Repeat in 4 years.  Mother had hx of colon cancer.    COLONOSCOPY - SCAN - [facility] 01/08/2021 (01/08/2021)    The following portions of the patient's history were reviewed and updated as appropriate: allergies, current medications, past family history, past medical history, past social history, past surgical history, and problem list.    Social and Family and Surgical History reviewed and updated today, see Rooming tab.    Health History, Preventive Measures and Vaccination flow sheets reviewed and updated today.    Patient's current medical chart in Epic; including previous office notes, imaging, labs, specialist's evaluation either in notes or in Media tab reviewed today.    Other pertinent medical information also reviewed thru Care Everywhere function is also reviewed today.    Review of Systems  Review of Systems  Vitals:    10/31/24 0757   BP: 118/80   BP Location: Left arm   Patient Position: Sitting   Cuff Size: Adult   Pulse: 69   Temp: 97.8 °F (36.6 °C)   SpO2: 98%   Weight: 84.4 kg (186 lb)   Height: 188 cm (74.02\")     Wt Readings from Last 3 Encounters:   10/31/24 84.4 kg (186 lb)   09/05/23 79.8 kg (176 lb)   09/01/23 80.7 kg (178 lb)       General Appearance:  Alert, cooperative, no distress, appears stated age   Head:  Normocephalic, without " obvious abnormality, atraumatic   Eyes:  PERRL, conjunctiva/corneas clear, EOM's intact.   Ears:  Normal TM's and external ear canals, both ears   Nose: Nares normal, septum midline, mucosa normal, no drainage or sinus tenderness   Throat: Lips, mucosa, and tongue normal; teeth and gums normal   Neck: Supple, symmetrical, trachea midline, no adenopathy;   thyroid: No enlargement/tenderness/nodules       Lungs:  Clear to auscultation bilaterally, respirations even and unlabored   Chest wall:  No tenderness or deformity   Heart:  Regular rate and rhythm, S1 and S2 normal, no murmur, rub or gallop   Abdomen:  Soft, non-tender, bowel sounds active all four quadrants,   no masses, no organomegaly           Extremities: Extremities normal, atraumatic, no cyanosis or edema   Pulses: 2+ and symmetric all extremities   Skin: Skin color, texture, turgor normal, no rashes or lesions   Lymph nodes: Cervical and supraclavicular nodes normal   Neurologic: CNII-XII intact. Normal strength.       BMI is within normal parameters. No other follow-up for BMI required.       Results for orders placed or performed in visit on 10/25/24   CBC (No Diff)    Collection Time: 10/24/24 10:12 AM    Specimen: Blood   Result Value Ref Range    WBC 5.13 3.40 - 10.80 10*3/mm3    RBC 5.05 4.14 - 5.80 10*6/mm3    Hemoglobin 15.7 13.0 - 17.7 g/dL    Hematocrit 45.7 37.5 - 51.0 %    MCV 90.5 79.0 - 97.0 fL    MCH 31.1 26.6 - 33.0 pg    MCHC 34.4 31.5 - 35.7 g/dL    RDW 12.7 12.3 - 15.4 %    Platelets 238 140 - 450 10*3/mm3   Comprehensive Metabolic Panel    Collection Time: 10/24/24 10:12 AM    Specimen: Blood   Result Value Ref Range    Glucose 93 65 - 99 mg/dL    BUN 20 6 - 20 mg/dL    Creatinine 1.06 0.76 - 1.27 mg/dL    EGFR Result 87.7 >60.0 mL/min/1.73    BUN/Creatinine Ratio 18.9 7.0 - 25.0    Sodium 140 136 - 145 mmol/L    Potassium 5.0 3.5 - 5.2 mmol/L    Chloride 103 98 - 107 mmol/L    Total CO2 25.9 22.0 - 29.0 mmol/L    Calcium 10.2 8.6 -  10.5 mg/dL    Total Protein 6.9 6.0 - 8.5 g/dL    Albumin 4.5 3.5 - 5.2 g/dL    Globulin 2.4 gm/dL    A/G Ratio 1.9 g/dL    Total Bilirubin 0.6 0.0 - 1.2 mg/dL    Alkaline Phosphatase 87 39 - 117 U/L    AST (SGOT) 20 1 - 40 U/L    ALT (SGPT) 14 1 - 41 U/L   Lipid Panel With / Chol / HDL Ratio    Collection Time: 10/24/24 10:12 AM    Specimen: Blood   Result Value Ref Range    Total Cholesterol 232 (H) 0 - 200 mg/dL    Triglycerides 124 0 - 150 mg/dL    HDL Cholesterol 49 40 - 60 mg/dL    VLDL Cholesterol Wilner 22 5 - 40 mg/dL    LDL Chol Calc (NIH) 161 (H) 0 - 100 mg/dL    Chol/HDL Ratio 4.73    TSH Rfx On Abnormal To Free T4    Collection Time: 10/24/24 10:12 AM    Specimen: Blood   Result Value Ref Range    TSH 2.090 0.270 - 4.200 uIU/mL   Unable To Void    Collection Time: 10/24/24 10:12 AM   Result Value Ref Range    Unable to Void Comment      Assessment & Plan   Healthy male exam.  Diagnoses and all orders for this visit:    1. Annual physical exam (Primary)  -     UA / M With / Rflx Culture([facility] ONLY) - Urine, Clean Catch  -     CBC (No Diff); Future  -     Comprehensive Metabolic Panel; Future  -     Lipid Panel With / Chol / HDL Ratio; Future  -     TSH Rfx On Abnormal To Free T4; Future    2. Encounter for screening colonoscopy  -     Ambulatory Referral For Screening Colonoscopy    3. Family history of colon cancer in mother  -     Ambulatory Referral For Screening Colonoscopy    4. Mixed hyperlipidemia  -     Lipid Panel With / Chol / HDL Ratio; Future      1. Kingsley Bejarano has been doing well since he was last seen.  Reviewed recent labs along with patient.  All remained stable except for continued elevated cholesterol levels however have improved since last year.  Will continue to manage with watching diet and incorporating exercise into daily routine.  Blood pressure stable at 118/80.  He takes no prescription medications on a regular basis.  2. Patient Counseling:  --Nutrition: Stressed importance  of moderation in sodium/caffeine intake, saturated fat and cholesterol.  Discussed caloric balance, sufficient intake of fresh fruits, vegetables, fiber,    calcium, iron.  --Exercise: Stressed the importance of regular exercise.   --Substance Abuse: Discussed cessation/primary prevention of tobacco, alcohol, or other drug use; driving or other dangerous activities under the influence.    --Dental health: Discussed importance of regular tooth brushing, flossing, and dental visits.  --Suggested having eyes and vision checked if needed or past due.  --Immunizations reviewed.  --Discussed benefits of screening colonoscopy.  Due in January.    3. Discussed the patient's BMI with him.  The BMI is in the acceptable range  4. Follow up next physical in 1 year    Patient was given instructions and counseling regarding condition or for health maintenance advice.  Please see specific information pulled into the AVS if appropriate.      There are no discontinued medications.       Kristin Rogers, EDWIN  Family Practice  [facility] [place]

## 2024-10-31 ENCOUNTER — OFFICE VISIT (OUTPATIENT)
Dept: FAMILY MEDICINE CLINIC | Facility: CLINIC | Age: 44
End: 2024-10-31
Payer: COMMERCIAL

## 2024-10-31 VITALS
HEART RATE: 69 BPM | BODY MASS INDEX: 23.87 KG/M2 | WEIGHT: 186 LBS | SYSTOLIC BLOOD PRESSURE: 118 MMHG | DIASTOLIC BLOOD PRESSURE: 80 MMHG | TEMPERATURE: 97.8 F | OXYGEN SATURATION: 98 % | HEIGHT: 74 IN

## 2024-10-31 DIAGNOSIS — Z12.11 ENCOUNTER FOR SCREENING COLONOSCOPY: ICD-10-CM

## 2024-10-31 DIAGNOSIS — Z00.00 ANNUAL PHYSICAL EXAM: Primary | ICD-10-CM

## 2024-10-31 DIAGNOSIS — Z80.0 FAMILY HISTORY OF COLON CANCER IN MOTHER: ICD-10-CM

## 2024-10-31 DIAGNOSIS — E78.2 MIXED HYPERLIPIDEMIA: ICD-10-CM

## 2024-10-31 PROCEDURE — 99396 PREV VISIT EST AGE 40-64: CPT | Performed by: NURSE PRACTITIONER

## 2024-11-01 LAB
APPEARANCE UR: CLEAR
BACTERIA #/AREA URNS HPF: NORMAL /[HPF]
BILIRUB UR QL STRIP: NEGATIVE
CASTS URNS QL MICRO: NORMAL /LPF
COLOR UR: YELLOW
EPI CELLS #/AREA URNS HPF: NORMAL /HPF (ref 0–10)
GLUCOSE UR QL STRIP: NEGATIVE
HGB UR QL STRIP: NEGATIVE
KETONES UR QL STRIP: ABNORMAL
LEUKOCYTE ESTERASE UR QL STRIP: NEGATIVE
MICRO URNS: ABNORMAL
MICRO URNS: ABNORMAL
NITRITE UR QL STRIP: NEGATIVE
PH UR STRIP: 6 [PH] (ref 5–7.5)
PROT UR QL STRIP: NEGATIVE
RBC #/AREA URNS HPF: NORMAL /HPF (ref 0–2)
SP GR UR STRIP: 1.02 (ref 1–1.03)
URINALYSIS REFLEX: ABNORMAL
UROBILINOGEN UR STRIP-MCNC: 0.2 MG/DL (ref 0.2–1)
WBC #/AREA URNS HPF: NORMAL /HPF (ref 0–5)